# Patient Record
Sex: FEMALE | Race: WHITE | NOT HISPANIC OR LATINO | URBAN - METROPOLITAN AREA
[De-identification: names, ages, dates, MRNs, and addresses within clinical notes are randomized per-mention and may not be internally consistent; named-entity substitution may affect disease eponyms.]

---

## 2017-11-13 ENCOUNTER — HOSPITAL ENCOUNTER (EMERGENCY)
Facility: OTHER | Age: 37
Discharge: HOME | End: 2017-11-13
Attending: OBSTETRICS & GYNECOLOGY

## 2017-11-13 VITALS
HEART RATE: 77 BPM | DIASTOLIC BLOOD PRESSURE: 61 MMHG | SYSTOLIC BLOOD PRESSURE: 116 MMHG | TEMPERATURE: 98 F | RESPIRATION RATE: 18 BRPM | OXYGEN SATURATION: 98 %

## 2017-11-13 DIAGNOSIS — N94.9 ROUND LIGAMENT PAIN: Primary | ICD-10-CM

## 2017-11-13 DIAGNOSIS — Z3A.21 21 WEEKS GESTATION OF PREGNANCY: ICD-10-CM

## 2017-11-13 LAB
BILIRUB SERPL-MCNC: NORMAL MG/DL
BLOOD URINE, POC: NORMAL
COLOR, POC UA: YELLOW
GLUCOSE UR QL STRIP: NORMAL
KETONES UR QL STRIP: NORMAL
LEUKOCYTE ESTERASE URINE, POC: NORMAL
NITRITE, POC UA: NORMAL
PH, POC UA: NORMAL
PROTEIN, POC: NORMAL
SPECIFIC GRAVITY, POC UA: NORMAL
UROBILINOGEN, POC UA: NORMAL

## 2017-11-13 PROCEDURE — 99284 EMERGENCY DEPT VISIT MOD MDM: CPT | Mod: ,,, | Performed by: OBSTETRICS & GYNECOLOGY

## 2017-11-13 PROCEDURE — 81002 URINALYSIS NONAUTO W/O SCOPE: CPT

## 2017-11-13 PROCEDURE — 99284 EMERGENCY DEPT VISIT MOD MDM: CPT | Mod: 25

## 2017-11-13 NOTE — NURSING
Pt presents to [facility] with c/o abdominal pain, states it is a sharp pain on either side of her lower abdomen.  Denies leakage of fluid or vaginal bleeding.

## 2017-11-13 NOTE — ED NOTES
Pt. Discharged to home. Instructed to buy and wear maternity belt and can take Tylenol for round ligament pain. Will follow up with her doctor in [place].

## 2017-11-13 NOTE — ED PROVIDER NOTES
Encounter Date: 2017       History     Chief Complaint   Patient presents with    Abdominal Pain     sharp pains on sides of lower abdomen     Kathy Fleming is a 37 y.o.  at 21w5d presents complaining of lower abdominal pain that is worse with movement.  Pt is in town for a ophthalmology convention and has been walking more than usual.  Started having bilateral lower abdominal/pelvic shooting pain last night.  Notes pain worse with walking and movement.  Relieved when lying on her left side.  Noted the pain increased after urination.  No dysuria/increased frequency.  Denies fever/chills.  Pain not in a consistent pattern. Pain relieved with tylenol.  Denies vaginal discharge/bleeding  This IUP is uncomplicated per patient.  Pt is from [place] and receives PNC there.  Patient denies contractions, denies vaginal bleeding, denies LOF.   Fetal Movement: normal.          Review of patient's allergies indicates:  Allergies not on file  No past medical history on file.  No past surgical history on file.  No family history on file.  Social History   Substance Use Topics    Smoking status: Not on file    Smokeless tobacco: Not on file    Alcohol use Not on file     Review of Systems   Constitutional: Negative for chills, fatigue and fever.   HENT: Negative for congestion.    Eyes: Negative for visual disturbance.   Respiratory: Negative for cough and shortness of breath.    Cardiovascular: Negative for chest pain and palpitations.   Gastrointestinal: Negative for abdominal distention, abdominal pain, constipation, diarrhea, nausea and vomiting.   Genitourinary: Positive for pelvic pain. Negative for difficulty urinating, dysuria, hematuria, vaginal bleeding and vaginal discharge.   Skin: Negative for rash.   Neurological: Negative for dizziness, seizures, light-headedness and headaches.   Hematological: Does not bruise/bleed easily.   Psychiatric/Behavioral: Negative for dysphoric mood. The patient is not  nervous/anxious.        Physical Exam     Initial Vitals     Temp:  [98.1 °F (36.7 °C)] 98.1 °F (36.7 °C)  Pulse:  [76-85] 77  Resp:  [18] 18  SpO2:  [98 %] 98 %  BP: (116)/(61) 116/61        Physical Exam    Constitutional: She appears well-developed and well-nourished. No distress.   HENT:   Head: Normocephalic and atraumatic.   Cardiovascular: Normal rate and regular rhythm.   Pulmonary/Chest: No respiratory distress.   Abdominal: Soft. She exhibits no distension. There is tenderness (mild TTP in bilateral lower quadrants.  No rebound/guarding). There is no rebound and no guarding.   Genitourinary: Vagina normal. No vaginal discharge found.   Neurological: She is alert and oriented to person, place, and time.   Psychiatric: She has a normal mood and affect.     OB LABOR EXAM:   Pre-Term Labor: No.   Membranes ruptured: No.   Method: Sterile vaginal exam per MD.   Vaginal Bleeding: none present.     Dilatation: 0.   Station: -3.   Effacement: 90%.   Amniotic Fluid Color: no fluid.           ED Course   Procedures  Labs Reviewed   POCT URINALYSIS, DIPSTICK OR TABLET REAGENT, AUTOMATED, WITH MICROSCOP - Normal             Medical Decision Making:   Initial Assessment:   38 y/o with lower pelvic pain  Differential Diagnosis:   Round ligament pain    contractions  UTI  ED Management:  SVE performed: c/t/h  FHT verified at 140s  [place] placed and no contractions noted  Abdominal exam without concerning signs  UDip performed and negative              Attending Attestation:   Physician Attestation Statement for Resident:  As the supervising MD   Physician Attestation Statement: I have personally seen and examined this patient.   I agree with the above history. -: Patient reports no known allergies.  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    As the supervising MD I agree with the above  treatment, course, plan, and disposition.   -: Patient evaluated and found to be stable, agree with resident's assessment of abdominal pain with no s/s pre-term labor and plan to discharge with precautions.  I was personally present during the critical portions of the procedure(s) performed by the resident and was immediately available in the ED to provide services and assistance as needed during the entire procedure.                    ED Course      Clinical Impression:   The encounter diagnosis was Round ligament pain.          Round ligament pain  - No signs of  contractions and labor  - UDip negative  - SVE c/t/h  - Given labor precautions.  Recommend Tylenol and maternity belt.  Avoid NSAIDs  - Ok to d/c home    Parul Masterson MD  PGY-4 OB/GYN  [phone]                   Parul Masterson MD  Resident  [phone]       Parul Masterson MD  Resident  [phone]       Anitra Barboza MD  [phone]